# Patient Record
Sex: FEMALE | URBAN - METROPOLITAN AREA
[De-identification: names, ages, dates, MRNs, and addresses within clinical notes are randomized per-mention and may not be internally consistent; named-entity substitution may affect disease eponyms.]

---

## 2022-04-14 ENCOUNTER — EMERGENCY (EMERGENCY)
Facility: HOSPITAL | Age: 47
LOS: 1 days | Discharge: HOME | End: 2022-04-14
Attending: EMERGENCY MEDICINE
Payer: COMMERCIAL

## 2022-04-14 VITALS
HEART RATE: 76 BPM | RESPIRATION RATE: 20 BRPM | OXYGEN SATURATION: 97 % | SYSTOLIC BLOOD PRESSURE: 154 MMHG | DIASTOLIC BLOOD PRESSURE: 98 MMHG | TEMPERATURE: 98 F

## 2022-04-14 PROCEDURE — 12002 RPR S/N/AX/GEN/TRNK2.6-7.5CM: CPT

## 2022-04-14 PROCEDURE — 70450 CT HEAD/BRAIN W/O DYE: CPT | Mod: 26,MA

## 2022-04-14 PROCEDURE — 99284 EMERGENCY DEPT VISIT MOD MDM: CPT | Mod: 25

## 2022-04-14 RX ORDER — TETANUS TOXOID, REDUCED DIPHTHERIA TOXOID AND ACELLULAR PERTUSSIS VACCINE, ADSORBED 5; 2.5; 8; 8; 2.5 [IU]/.5ML; [IU]/.5ML; UG/.5ML; UG/.5ML; UG/.5ML
0.5 SUSPENSION INTRAMUSCULAR ONCE
Refills: 0 | Status: COMPLETED | OUTPATIENT
Start: 2022-04-14 | End: 2022-04-14

## 2022-04-14 RX ORDER — ACETAMINOPHEN 500 MG
975 TABLET ORAL ONCE
Refills: 0 | Status: COMPLETED | OUTPATIENT
Start: 2022-04-14 | End: 2022-04-14

## 2022-04-14 RX ADMIN — Medication 975 MILLIGRAM(S): at 22:04

## 2022-04-14 RX ADMIN — TETANUS TOXOID, REDUCED DIPHTHERIA TOXOID AND ACELLULAR PERTUSSIS VACCINE, ADSORBED 0.5 MILLILITER(S): 5; 2.5; 8; 8; 2.5 SUSPENSION INTRAMUSCULAR at 22:06

## 2022-04-14 RX ADMIN — Medication 1 DROP(S): at 22:04
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
## 2022-04-14 NOTE — ED PROVIDER NOTE - PHYSICAL EXAMINATION
Gen: NAD, AAOx3, non-toxic appearing  HEENT: 2 cm superficial laceration of mid forehead, normal conjunctiva, oral mucosa moist, no obvious foreign body in the eye, sclera appear normal   Lung: speaking in full sentences, good aeration bilaterally, lungs CTA b/l  CV: regular rate and rhythm. cap refill <2x. peripheral pulses 2+bilaterally   Abd: soft, ND, NT, no abdominal bruising  MSK: no visible deformities  Neuro: No focal deficits  Skin: Intact, small dust-like pieces of glass across entire body  Psych: normal affect

## 2022-04-14 NOTE — ED PROVIDER NOTE - NSFOLLOWUPINSTRUCTIONS_ED_ALL_ED_FT
You were evaluated in the Emergency Department for head injury.      There are no signs of emergency conditions requiring admission to the hospital on today's workup.      We recommend that you see your primary care physician within the next 3 days for follow up.  Bring a copy of your discharge paperwork (including any test results) to your doctor.    For pain you can take ibuprofen (Motrin, Advil) or acetaminophen (Tylenol) as needed, as directed on packaging.     You had FOUR (4) sutures placed which require removal in 5-7 days at either urgent care or return to the emergency department.     ***Return to the emergency department if you have any other new/concerning symptoms, including but not limited to: worsening headache, confusion, nausea, vomiting, redness around your stitches or drainage, fevers/chills***
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
## 2022-04-14 NOTE — ED PROVIDER NOTE - PATIENT PORTAL LINK FT
You can access the FollowMyHealth Patient Portal offered by [facility] by registering at the following website: [email or web address]. By joining [employer] [facility]’s FollowMyHealth portal, you will also be able to view your health information using other applications (apps) compatible with our system.

## 2022-04-14 NOTE — ED ADULT NURSE REASSESSMENT NOTE - NS ED NURSE REASSESS COMMENT FT1
Report received from LANE Alejandra. Pt AAOx4, resp nonlabored, skin warm/dry, resting comfortably in bed while eye irrigation is on  process. Pt complaints of headache, states Tylenol helped to relief the pain a little but still on. MD notified, will order pain medicine. Safety and comfort measures initiated- bed placed in lowest position and side rails raised. Pt oriented to call bell system.
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
## 2022-04-14 NOTE — ED PROVIDER NOTE - NS ED ROS FT
Constitutional:  (-) fever, (-) chills, (-) fatigue  Eyes:  (+) eye pain (-) visual changes  ENMT: (-) nasal discharge, (-) sore throat. (-) neck pain or stiffness  Cardiac: (-) chest pain (-) palpitations  Respiratory:  (-) cough (-) shortness of breath  GI:  (-) nausea (-) vomiting (-) diarrhea (-) abdominal pain  :  (-) dysuria (-) frequency (-) burning  MS:  (-) back pain (-) joint pain  Neuro:  (+) headache (-) numbness (-) tingling (-) focal weakness  Skin:  (-) rash  Except as documented in the HPI,  all other systems are negative

## 2022-04-14 NOTE — ED PROVIDER NOTE - PROGRESS NOTE DETAILS
Eleni, PGY2: continuous irrigation ongoing with saline bag tubing. patient tolerating well. Eleni, PGY2: patient tolerated irrigation with 1 L NS. no dye uptake concerning for corneal abrasion with fluorescin stain. lac repaired. patient updated on CT findings.

## 2022-04-15 VITALS
TEMPERATURE: 98 F | SYSTOLIC BLOOD PRESSURE: 154 MMHG | DIASTOLIC BLOOD PRESSURE: 86 MMHG | OXYGEN SATURATION: 99 % | RESPIRATION RATE: 18 BRPM | HEART RATE: 86 BPM

## 2022-04-15 DIAGNOSIS — H57.9 UNSPECIFIED DISORDER OF EYE AND ADNEXA: ICD-10-CM

## 2022-04-15 PROCEDURE — 70450 CT HEAD/BRAIN W/O DYE: CPT | Mod: MA

## 2022-04-15 PROCEDURE — 90471 IMMUNIZATION ADMIN: CPT

## 2022-04-15 PROCEDURE — 90715 TDAP VACCINE 7 YRS/> IM: CPT

## 2022-04-15 PROCEDURE — 12013 RPR F/E/E/N/L/M 2.6-5.0 CM: CPT

## 2022-04-15 PROCEDURE — 99284 EMERGENCY DEPT VISIT MOD MDM: CPT | Mod: 25

## 2022-04-15 RX ORDER — IBUPROFEN 200 MG
600 TABLET ORAL ONCE
Refills: 0 | Status: COMPLETED | OUTPATIENT
Start: 2022-04-15 | End: 2022-04-15

## 2022-04-15 RX ADMIN — Medication 600 MILLIGRAM(S): at 00:45
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
## 2023-07-21 NOTE — ED ADULT NURSE NOTE - CCCP TRG CHIEF CMPLNT
head inj Niacinamide Counseling: I recommended taking niacin or niacinamide, also know as vitamin B3, twice daily. Recent evidence suggests that taking vitamin B3 (500 mg twice daily) can reduce the risk of actinic keratoses and non-melanoma skin cancers. Side effects of vitamin B3 include flushing and headache.